# Patient Record
Sex: FEMALE | Race: BLACK OR AFRICAN AMERICAN | Employment: STUDENT | URBAN - METROPOLITAN AREA
[De-identification: names, ages, dates, MRNs, and addresses within clinical notes are randomized per-mention and may not be internally consistent; named-entity substitution may affect disease eponyms.]

---

## 2022-05-11 ENCOUNTER — APPOINTMENT (OUTPATIENT)
Dept: CT IMAGING | Age: 22
End: 2022-05-11
Attending: PEDIATRICS
Payer: COMMERCIAL

## 2022-05-11 ENCOUNTER — HOSPITAL ENCOUNTER (EMERGENCY)
Age: 22
Discharge: HOME | End: 2022-05-11
Attending: PEDIATRICS
Payer: COMMERCIAL

## 2022-05-11 VITALS
HEART RATE: 62 BPM | TEMPERATURE: 98.3 F | WEIGHT: 136.91 LBS | SYSTOLIC BLOOD PRESSURE: 120 MMHG | DIASTOLIC BLOOD PRESSURE: 77 MMHG | RESPIRATION RATE: 20 BRPM | OXYGEN SATURATION: 99 %

## 2022-05-11 DIAGNOSIS — K60.2 ANAL FISSURE: ICD-10-CM

## 2022-05-11 DIAGNOSIS — N39.0 URINARY TRACT INFECTION WITHOUT HEMATURIA, SITE UNSPECIFIED: Primary | ICD-10-CM

## 2022-05-11 DIAGNOSIS — K64.9 HEMORRHOIDS, UNSPECIFIED HEMORRHOID TYPE: ICD-10-CM

## 2022-05-11 DIAGNOSIS — R45.851 SUICIDAL THOUGHTS: ICD-10-CM

## 2022-05-11 LAB
ALBUMIN SERPL-MCNC: 3.8 G/DL (ref 3.5–5)
ALBUMIN/GLOB SERPL: 1.1 {RATIO} (ref 1.1–2.2)
ALP SERPL-CCNC: 96 U/L (ref 45–117)
ALT SERPL-CCNC: 19 U/L (ref 12–78)
AMPHET UR QL SCN: NEGATIVE
ANION GAP SERPL CALC-SCNC: 4 MMOL/L (ref 5–15)
APPEARANCE UR: CLEAR
APTT PPP: 26.5 SEC (ref 22.1–31)
AST SERPL-CCNC: 21 U/L (ref 15–37)
ATRIAL RATE: 63 BPM
BACTERIA URNS QL MICRO: NEGATIVE /HPF
BARBITURATES UR QL SCN: NEGATIVE
BASOPHILS # BLD: 0 K/UL (ref 0–0.1)
BASOPHILS NFR BLD: 0 % (ref 0–1)
BENZODIAZ UR QL: NEGATIVE
BILIRUB SERPL-MCNC: 0.6 MG/DL (ref 0.2–1)
BILIRUB UR QL: NEGATIVE
BUN SERPL-MCNC: 9 MG/DL (ref 6–20)
BUN/CREAT SERPL: 10 (ref 12–20)
CALCIUM SERPL-MCNC: 8.8 MG/DL (ref 8.5–10.1)
CALCULATED P AXIS, ECG09: 58 DEGREES
CALCULATED R AXIS, ECG10: 82 DEGREES
CALCULATED T AXIS, ECG11: 51 DEGREES
CANNABINOIDS UR QL SCN: NEGATIVE
CHLORIDE SERPL-SCNC: 109 MMOL/L (ref 97–108)
CO2 SERPL-SCNC: 25 MMOL/L (ref 21–32)
COCAINE UR QL SCN: NEGATIVE
COLOR UR: ABNORMAL
COMMENT, HOLDF: NORMAL
CREAT SERPL-MCNC: 0.87 MG/DL (ref 0.55–1.02)
CRP SERPL-MCNC: <0.29 MG/DL (ref 0–0.6)
D DIMER PPP FEU-MCNC: 0.38 MG/L FEU (ref 0–0.65)
DIAGNOSIS, 93000: NORMAL
DIFFERENTIAL METHOD BLD: ABNORMAL
DRUG SCRN COMMENT,DRGCM: NORMAL
EOSINOPHIL # BLD: 0.2 K/UL (ref 0–0.4)
EOSINOPHIL NFR BLD: 2 % (ref 0–7)
EPITH CASTS URNS QL MICRO: ABNORMAL /LPF
ERYTHROCYTE [DISTWIDTH] IN BLOOD BY AUTOMATED COUNT: 12.9 % (ref 11.5–14.5)
ERYTHROCYTE [SEDIMENTATION RATE] IN BLOOD: 3 MM/HR (ref 0–20)
GLOBULIN SER CALC-MCNC: 3.6 G/DL (ref 2–4)
GLUCOSE SERPL-MCNC: 112 MG/DL (ref 65–100)
GLUCOSE UR STRIP.AUTO-MCNC: NEGATIVE MG/DL
HCG UR QL: NEGATIVE
HCT VFR BLD AUTO: 41.9 % (ref 35–47)
HGB BLD-MCNC: 15.4 G/DL (ref 11.5–16)
HGB UR QL STRIP: NEGATIVE
HYALINE CASTS URNS QL MICRO: ABNORMAL /LPF (ref 0–5)
IMM GRANULOCYTES # BLD AUTO: 0 K/UL (ref 0–0.04)
IMM GRANULOCYTES NFR BLD AUTO: 0 % (ref 0–0.5)
INR PPP: 1.1 (ref 0.9–1.1)
KETONES UR QL STRIP.AUTO: ABNORMAL MG/DL
LEUKOCYTE ESTERASE UR QL STRIP.AUTO: ABNORMAL
LIPASE SERPL-CCNC: 73 U/L (ref 73–393)
LYMPHOCYTES # BLD: 2 K/UL (ref 0.8–3.5)
LYMPHOCYTES NFR BLD: 21 % (ref 12–49)
MCH RBC QN AUTO: 31.5 PG (ref 26–34)
MCHC RBC AUTO-ENTMCNC: 36.8 G/DL (ref 30–36.5)
MCV RBC AUTO: 85.7 FL (ref 80–99)
METHADONE UR QL: NEGATIVE
MONOCYTES # BLD: 1 K/UL (ref 0–1)
MONOCYTES NFR BLD: 10 % (ref 5–13)
NEUTS SEG # BLD: 6.3 K/UL (ref 1.8–8)
NEUTS SEG NFR BLD: 67 % (ref 32–75)
NITRITE UR QL STRIP.AUTO: NEGATIVE
NRBC # BLD: 0 K/UL (ref 0–0.01)
NRBC BLD-RTO: 0 PER 100 WBC
OPIATES UR QL: NEGATIVE
P-R INTERVAL, ECG05: 130 MS
PCP UR QL: NEGATIVE
PH UR STRIP: 5 [PH] (ref 5–8)
PLATELET # BLD AUTO: 271 K/UL (ref 150–400)
PMV BLD AUTO: 10.9 FL (ref 8.9–12.9)
POTASSIUM SERPL-SCNC: 3.9 MMOL/L (ref 3.5–5.1)
PROT SERPL-MCNC: 7.4 G/DL (ref 6.4–8.2)
PROT UR STRIP-MCNC: NEGATIVE MG/DL
PROTHROMBIN TIME: 11 SEC (ref 9–11.1)
Q-T INTERVAL, ECG07: 392 MS
QRS DURATION, ECG06: 76 MS
QTC CALCULATION (BEZET), ECG08: 401 MS
RBC # BLD AUTO: 4.89 M/UL (ref 3.8–5.2)
RBC #/AREA URNS HPF: ABNORMAL /HPF (ref 0–5)
SAMPLES BEING HELD,HOLD: NORMAL
SODIUM SERPL-SCNC: 138 MMOL/L (ref 136–145)
SP GR UR REFRACTOMETRY: 1.02 (ref 1–1.03)
THERAPEUTIC RANGE,PTTT: NORMAL SECS (ref 58–77)
TROPONIN-HIGH SENSITIVITY: <4 NG/L (ref 0–51)
UR CULT HOLD, URHOLD: NORMAL
UROBILINOGEN UR QL STRIP.AUTO: 0.2 EU/DL (ref 0.2–1)
VENTRICULAR RATE, ECG03: 63 BPM
WBC # BLD AUTO: 9.5 K/UL (ref 3.6–11)
WBC URNS QL MICRO: ABNORMAL /HPF (ref 0–4)

## 2022-05-11 PROCEDURE — 81001 URINALYSIS AUTO W/SCOPE: CPT

## 2022-05-11 PROCEDURE — 96361 HYDRATE IV INFUSION ADD-ON: CPT

## 2022-05-11 PROCEDURE — 81025 URINE PREGNANCY TEST: CPT

## 2022-05-11 PROCEDURE — 80053 COMPREHEN METABOLIC PANEL: CPT

## 2022-05-11 PROCEDURE — 85379 FIBRIN DEGRADATION QUANT: CPT

## 2022-05-11 PROCEDURE — 85610 PROTHROMBIN TIME: CPT

## 2022-05-11 PROCEDURE — 83690 ASSAY OF LIPASE: CPT

## 2022-05-11 PROCEDURE — 85652 RBC SED RATE AUTOMATED: CPT

## 2022-05-11 PROCEDURE — 85025 COMPLETE CBC W/AUTO DIFF WBC: CPT

## 2022-05-11 PROCEDURE — 74011000636 HC RX REV CODE- 636: Performed by: RADIOLOGY

## 2022-05-11 PROCEDURE — 93005 ELECTROCARDIOGRAM TRACING: CPT

## 2022-05-11 PROCEDURE — 85730 THROMBOPLASTIN TIME PARTIAL: CPT

## 2022-05-11 PROCEDURE — 36415 COLL VENOUS BLD VENIPUNCTURE: CPT

## 2022-05-11 PROCEDURE — C9113 INJ PANTOPRAZOLE SODIUM, VIA: HCPCS | Performed by: PEDIATRICS

## 2022-05-11 PROCEDURE — 96365 THER/PROPH/DIAG IV INF INIT: CPT

## 2022-05-11 PROCEDURE — 86140 C-REACTIVE PROTEIN: CPT

## 2022-05-11 PROCEDURE — 74011250637 HC RX REV CODE- 250/637: Performed by: PEDIATRICS

## 2022-05-11 PROCEDURE — 74011000250 HC RX REV CODE- 250: Performed by: PEDIATRICS

## 2022-05-11 PROCEDURE — 87086 URINE CULTURE/COLONY COUNT: CPT

## 2022-05-11 PROCEDURE — 84484 ASSAY OF TROPONIN QUANT: CPT

## 2022-05-11 PROCEDURE — 99285 EMERGENCY DEPT VISIT HI MDM: CPT

## 2022-05-11 PROCEDURE — 74177 CT ABD & PELVIS W/CONTRAST: CPT

## 2022-05-11 PROCEDURE — 74011000258 HC RX REV CODE- 258: Performed by: PEDIATRICS

## 2022-05-11 PROCEDURE — 74011250636 HC RX REV CODE- 250/636: Performed by: PEDIATRICS

## 2022-05-11 PROCEDURE — 96375 TX/PRO/DX INJ NEW DRUG ADDON: CPT

## 2022-05-11 PROCEDURE — 80307 DRUG TEST PRSMV CHEM ANLYZR: CPT

## 2022-05-11 RX ORDER — ONDANSETRON 4 MG/1
4 TABLET, FILM COATED ORAL
Qty: 10 TABLET | Refills: 0 | Status: SHIPPED | OUTPATIENT
Start: 2022-05-11

## 2022-05-11 RX ORDER — CEPHALEXIN 500 MG/1
500 CAPSULE ORAL 4 TIMES DAILY
Qty: 28 CAPSULE | Refills: 0 | Status: SHIPPED | OUTPATIENT
Start: 2022-05-11 | End: 2022-05-18

## 2022-05-11 RX ORDER — ONDANSETRON 2 MG/ML
4 INJECTION INTRAMUSCULAR; INTRAVENOUS
Status: COMPLETED | OUTPATIENT
Start: 2022-05-11 | End: 2022-05-11

## 2022-05-11 RX ORDER — IBUPROFEN 600 MG/1
600 TABLET ORAL
Qty: 20 TABLET | Refills: 0 | Status: SHIPPED | OUTPATIENT
Start: 2022-05-11

## 2022-05-11 RX ORDER — POLYETHYLENE GLYCOL 3350 17 G/17G
17 POWDER, FOR SOLUTION ORAL DAILY
Qty: 850 G | Refills: 0 | Status: SHIPPED | OUTPATIENT
Start: 2022-05-11

## 2022-05-11 RX ADMIN — ALUMINUM HYDROXIDE, MAGNESIUM HYDROXIDE, AND SIMETHICONE 40 ML: 200; 200; 20 SUSPENSION ORAL at 03:20

## 2022-05-11 RX ADMIN — SODIUM CHLORIDE 40 MG: 9 INJECTION, SOLUTION INTRAMUSCULAR; INTRAVENOUS; SUBCUTANEOUS at 03:21

## 2022-05-11 RX ADMIN — CEFTRIAXONE SODIUM 2 G: 2 INJECTION, POWDER, FOR SOLUTION INTRAMUSCULAR; INTRAVENOUS at 03:26

## 2022-05-11 RX ADMIN — SODIUM CHLORIDE 1000 ML: 9 INJECTION, SOLUTION INTRAVENOUS at 01:31

## 2022-05-11 RX ADMIN — IOPAMIDOL 100 ML: 755 INJECTION, SOLUTION INTRAVENOUS at 02:57

## 2022-05-11 RX ADMIN — ONDANSETRON HYDROCHLORIDE 4 MG: 2 SOLUTION INTRAMUSCULAR; INTRAVENOUS at 01:31

## 2022-05-11 NOTE — ED NOTES
Antibiotic running without difficulty. Pt resting in stretcher with boyfriend at bedside. MD at bedside discussing results and follow-up care.

## 2022-05-11 NOTE — BSMART NOTE
BSMART assessment completed, and suicide risk level noted to be low. Primary Nurse Delano Jimenez and Charge Nurse [facility] and Physician Romulo notified. Concerns not observed. Security/Off- has not been notified.

## 2022-05-11 NOTE — ED TRIAGE NOTES
Triage Note: Pt. C/o abdominal pain that started around 11 pm. Pt. States she had a BM and noticed a glob of blood on tissue paper. Pt. Denies fever. No Meds PTA.

## 2022-05-11 NOTE — BSMART NOTE
SAFETY PLAN    A suicide Safety Plan is a document that supports someone when they are having thoughts of suicide. Warning Signs that indicate a suicidal crisis may be developing: What (situations, thoughts, feelings, body sensations, behaviors, etc.) do you experience that lets you know you are beginning to think about suicide? 1. Excessive Idle time   2. Withdrawn from others and refraining from talking about concerns  3. Irritability   4. Irrationally making decisions   5. Inability to complete every day task (bathing, brushing teeth , eating routinely, or sleeping )    Internal and External Coping Strategies:  What things can I do (relaxation techniques, hobbies, physical activities, etc.) to take my mind off my problems without contacting another person?  -Sleeping taking routine naps   -photography   -videography   -modeling   -Spending reasonable time with preferred loved ones doing something fun /redirect away from idle time  -deep breathing   -counting from 1-10 and 10-1 when feeling anxious          People and social settings that provide distraction: Who can I call or where can I go to distract me? Boyfriend Ralf Mora    People whom I can ask for help: Who can I call when I need help - for example, friends, family, clergy, someone else? Boy Friend / Brittany Wright   God Father / Mr. Titi Núñez [phone]  God Mother/Lida Núñez [phone]    Professionals or [address] I can contact during a crisis: Who can I call for help - for example, my doctor, my psychiatrist, my psychologist, a mental health provider, a suicide hotline? 1. Suicide Prevention Lifeline: 3-145-910-TALK (6051)    2. [address] [place] [address] [phone]     3. [facility] Crisis line - [phone]      [place] [employer] - [phone]  (requesting a CIT train officer/ A Crisis Intervention Team (CIT).   *CIT improves communication, identifies mental health resources for those in crisis and ensures officer and community safety. *    Making the environment safe: How can I make my environment (house/apartment/living space) safer? For example, can I remove guns, medications, and other items? 1. Removing all sharps and items that could be considered weapons   2. Providing supervision throughout the night to ensure health and safety   3.  Removal of pills and locking away     Writer provided Pt the following resources:  [facility]/National counseling group   OP providers list  Partial hospitalization program if needed     Writer encouraged Pt to follow-up with her insurance to locate additional providers in her network for counseling (individual and couples)

## 2022-05-11 NOTE — BSMART NOTE
Writer contacted God father/ Mr. Denver Quivers at number provided [phone] who agreed to complete check-in with Pt and Pt's boyfriend Ramakrishna Rolan) who was present with Pt at bedside.

## 2022-05-11 NOTE — ED NOTES
Pt discharged home. Pt acting age appropriately, respirations regular and unlabored, cap refill less than two seconds. Skin pink, dry and warm. Lungs clear bilaterally. No further complaints at this time. Pt verbalized understanding of discharge paperwork and has no further questions at this time. Education provided about continuation of care, follow up care and medication administration: Take all medications as prescribed by provider. Follow-up with GI for further evaluation and return to ED for worsening symptoms or further concerns. Pt able to provided teach back about discharge instructions.

## 2022-05-11 NOTE — ED PROVIDER NOTES
The history is provided by the patient and a parent. Abdominal Pain   This is a new problem. The current episode started 6 to 12 hours ago. The problem occurs constantly. The problem has been gradually worsening (has had some back and body aches. no fever. Abd pain from epigastrum to suprapubic tonight. Had a normal BM but when wiping had blood and clots. ). The pain is located in the generalized abdominal region. The quality of the pain is cramping and sharp. The pain is moderate. Associated symptoms include anorexia (for a few days. Worse today), nausea, frequency, myalgias, chest pain and back pain. Pertinent negatives include no fever, no belching, no diarrhea, no flatus, no hematochezia, no melena (blood on tp), no vomiting, no constipation, no dysuria, no hematuria, no headaches and no trauma. Nothing worsens the pain. The pain is relieved by nothing. Her past medical history is significant for PUD. No vaginal complaints    IMM UTD    SI,   Past Medical History:   Diagnosis Date    Gonorrhea        Past Surgical History:   Procedure Laterality Date    HX ORTHOPAEDIC      left arm right leg         History reviewed. No pertinent family history.     Social History     Socioeconomic History    Marital status: SINGLE     Spouse name: Not on file    Number of children: Not on file    Years of education: Not on file    Highest education level: Not on file   Occupational History    Not on file   Tobacco Use    Smoking status: Never Smoker    Smokeless tobacco: Never Used   Substance and Sexual Activity    Alcohol use: No    Drug use: No    Sexual activity: Never     Partners: Male   Other Topics Concern    Not on file   Social History Narrative    Not on file     Social Determinants of Health     Financial Resource Strain:     Difficulty of Paying Living Expenses: Not on file   Food Insecurity:     Worried About Running Out of Food in the Last Year: Not on file    [address] in the Last Year: Not on file   Transportation Needs:     Lack of Transportation (Medical): Not on file    Lack of Transportation (Non-Medical): Not on file   Physical Activity:     Days of Exercise per Week: Not on file    Minutes of Exercise per Session: Not on file   Stress:     Feeling of Stress : Not on file   Social Connections:     Frequency of Communication with Friends and Family: Not on file    Frequency of Social Gatherings with Friends and Family: Not on file    Attends [employer] Services: Not on file    Active Member of [address] [place] [address] [employer] or Organizations: Not on file    Attends Club or Organization Meetings: Not on file    Marital Status: Not on file   Intimate Partner Violence:     Fear of Current or Ex-Partner: Not on file    Emotionally Abused: Not on file    Physically Abused: Not on file    Sexually Abused: Not on file   Housing Stability:     Unable to Pay for Housing in the Last Year: Not on file    Number of [place] in the Last Year: Not on file    Unstable Housing in the Last Year: Not on file         ALLERGIES: Patient has no known allergies. Review of Systems   Constitutional: Positive for appetite change. Negative for activity change, chills and fever. HENT: Negative for sore throat. Eyes: Negative for photophobia and visual disturbance. Respiratory: Negative for cough, chest tightness and shortness of breath. Cardiovascular: Positive for chest pain. Gastrointestinal: Positive for abdominal pain, anorexia (for a few days. Worse today) and nausea. Negative for constipation, diarrhea, flatus, hematochezia, melena (blood on tp) and vomiting. Genitourinary: Positive for flank pain and frequency. Negative for decreased urine volume, dysuria, enuresis, hematuria, menstrual problem, vaginal bleeding, vaginal discharge and vaginal pain. Musculoskeletal: Positive for back pain and myalgias. Negative for neck pain. Skin: Negative for rash.    Allergic/Immunologic: Negative for immunocompromised state. Neurological: Positive for light-headedness. Negative for headaches. Hematological: Does not bruise/bleed easily. Psychiatric/Behavioral: Positive for suicidal ideas. Vitals:    05/11/22 0058   Weight: 62.1 kg (136 lb 14.5 oz)   HR: 79  RR; 20  BP:127/87 T:98.2    Physical Exam   Physical Exam   Constitutional: Appears well-developed and well-nourished. uncomfortable  HENT:   Head: NCAT  Ears: Right Ear: Tympanic membrane normal. Left Ear: Tympanic membrane normal.   Nose: Nose normal. No nasal discharge. Mouth/Throat: Mucous membranes are moist. Pharynx is normal.   Eyes: Conjunctivae are normal. Right eye exhibits no discharge. Left eye exhibits no discharge. Neck: Normal range of motion. Neck supple. Cardiovascular: Normal rate, regular rhythm, S1 normal and S2 normal. No murmur   2+ distal pulses   Pulmonary/Chest: Effort normal and breath sounds normal. No nasal flaring or stridor. No respiratory distress. no wheezes. no rhonchi. no rales. no retraction. Abdominal: Soft. . Generalized tenderness. No rebound or guarding. No hernia. No masses or HSM. Bilateral CVA tenderness  Genitourinary:  Normal inspection. No rash. External hemhorroids and small fissure noted  Musculoskeletal: Normal range of motion. no edema, no tenderness, no deformity and no signs of injury. Lymphadenopathy:     no cervical adenopathy. Neurological:  alert. normal strength. normal muscle tone. No focal defecits  Skin: Skin is warm and dry. Capillary refill takes less than 3 seconds. Turgor is normal. No petechiae, no purpura and no rash noted. No cyanosis. MDM     Bilateral flank pain. Blood in stool. Bodyaches. Hx of Ulcers. Unsure of here she was scoped in past. Off meds now. Will check CT, UA, labs, IM. UTi, vs IBS, vs PUD. PPI, zofran, bolus. Patient also has [place] thoughts.  BSMART consult    5:37 AM  Recent Results (from the past 24 hour(s))   URINALYSIS W/MICROSCOPIC    Collection Time: 05/11/22  1:22 AM   Result Value Ref Range    Color YELLOW/STRAW      Appearance CLEAR CLEAR      Specific gravity 1.019 1.003 - 1.030      pH (UA) 5.0 5.0 - 8.0      Protein Negative NEG mg/dL    Glucose Negative NEG mg/dL    Ketone TRACE (A) NEG mg/dL    Bilirubin Negative NEG      Blood Negative NEG      Urobilinogen 0.2 0.2 - 1.0 EU/dL    Nitrites Negative NEG      Leukocyte Esterase LARGE (A) NEG      WBC  0 - 4 /hpf    RBC 0-5 0 - 5 /hpf    Epithelial cells FEW FEW /lpf    Bacteria Negative NEG /hpf    Hyaline cast 2-5 0 - 5 /lpf   URINE CULTURE HOLD SAMPLE    Collection Time: 05/11/22  1:22 AM    Specimen: Serum; Urine   Result Value Ref Range    Urine culture hold        Urine on hold in Microbiology dept for 2 days. If unpreserved urine is submitted, it cannot be used for addtional testing after 24 hours, recollection will be required. DRUG SCREEN, URINE    Collection Time: 05/11/22  1:22 AM   Result Value Ref Range    AMPHETAMINES Negative NEG      BARBITURATES Negative NEG      BENZODIAZEPINES Negative NEG      COCAINE Negative NEG      METHADONE Negative NEG      OPIATES Negative NEG      PCP(PHENCYCLIDINE) Negative NEG      THC (TH-CANNABINOL) Negative NEG      Drug screen comment (NOTE)    CBC WITH AUTOMATED DIFF    Collection Time: 05/11/22  1:28 AM   Result Value Ref Range    WBC 9.5 3.6 - 11.0 K/uL    RBC 4.89 3.80 - 5.20 M/uL    HGB 15.4 11.5 - 16.0 g/dL    HCT 41.9 35.0 - 47.0 %    MCV 85.7 80.0 - 99.0 FL    MCH 31.5 26.0 - 34.0 PG    MCHC 36.8 (H) 30.0 - 36.5 g/dL    RDW 12.9 11.5 - 14.5 %    PLATELET 145 997 - 642 K/uL    MPV 10.9 8.9 - 12.9 FL    NRBC 0.0 0  WBC    ABSOLUTE NRBC 0.00 0.00 - 0.01 K/uL    NEUTROPHILS 67 32 - 75 %    LYMPHOCYTES 21 12 - 49 %    MONOCYTES 10 5 - 13 %    EOSINOPHILS 2 0 - 7 %    BASOPHILS 0 0 - 1 %    IMMATURE GRANULOCYTES 0 0.0 - 0.5 %    ABS. NEUTROPHILS 6.3 1.8 - 8.0 K/UL    ABS. LYMPHOCYTES 2.0 0.8 - 3.5 K/UL    ABS.  MONOCYTES 1.0 0.0 - 1.0 K/UL    ABS. EOSINOPHILS 0.2 0.0 - 0.4 K/UL    ABS. BASOPHILS 0.0 0.0 - 0.1 K/UL    ABS. IMM. GRANS. 0.0 0.00 - 0.04 K/UL    DF AUTOMATED     METABOLIC PANEL, COMPREHENSIVE    Collection Time: 05/11/22  1:28 AM   Result Value Ref Range    Sodium 138 136 - 145 mmol/L    Potassium 3.9 3.5 - 5.1 mmol/L    Chloride 109 (H) 97 - 108 mmol/L    CO2 25 21 - 32 mmol/L    Anion gap 4 (L) 5 - 15 mmol/L    Glucose 112 (H) 65 - 100 mg/dL    BUN 9 6 - 20 MG/DL    Creatinine 0.87 0.55 - 1.02 MG/DL    BUN/Creatinine ratio 10 (L) 12 - 20      GFR est AA >60 >60 ml/min/1.73m2    GFR est non-AA >60 >60 ml/min/1.73m2    Calcium 8.8 8.5 - 10.1 MG/DL    Bilirubin, total 0.6 0.2 - 1.0 MG/DL    ALT (SGPT) 19 12 - 78 U/L    AST (SGOT) 21 15 - 37 U/L    Alk.  phosphatase 96 45 - 117 U/L    Protein, total 7.4 6.4 - 8.2 g/dL    Albumin 3.8 3.5 - 5.0 g/dL    Globulin 3.6 2.0 - 4.0 g/dL    A-G Ratio 1.1 1.1 - 2.2     LIPASE    Collection Time: 05/11/22  1:28 AM   Result Value Ref Range    Lipase 73 73 - 393 U/L   SED RATE (ESR)    Collection Time: 05/11/22  1:28 AM   Result Value Ref Range    Sed rate, automated 3 0 - 20 mm/hr   C REACTIVE PROTEIN, QT    Collection Time: 05/11/22  1:28 AM   Result Value Ref Range    C-Reactive protein <0.29 0.00 - 0.60 mg/dL   PROTHROMBIN TIME + INR    Collection Time: 05/11/22  1:28 AM   Result Value Ref Range    INR 1.1 0.9 - 1.1      Prothrombin time 11.0 9.0 - 11.1 sec   PTT    Collection Time: 05/11/22  1:28 AM   Result Value Ref Range    aPTT 26.5 22.1 - 31.0 sec    aPTT, therapeutic range     58.0 - 77.0 SECS   D DIMER    Collection Time: 05/11/22  1:28 AM   Result Value Ref Range    D-dimer 0.38 0.00 - 0.65 mg/L FEU   TROPONIN-HIGH SENSITIVITY    Collection Time: 05/11/22  1:28 AM   Result Value Ref Range    Troponin-High Sensitivity <4 0 - 51 ng/L   SAMPLES BEING HELD    Collection Time: 05/11/22  1:28 AM   Result Value Ref Range    SAMPLES BEING HELD 1LAV 1RED     COMMENT        Add-on orders for these samples will be processed based on acceptable specimen integrity and analyte stability, which may vary by analyte. EKG, 12 LEAD, INITIAL    Collection Time: 05/11/22  1:47 AM   Result Value Ref Range    Ventricular Rate 63 BPM    Atrial Rate 63 BPM    P-R Interval 130 ms    QRS Duration 76 ms    Q-T Interval 392 ms    QTC Calculation (Bezet) 401 ms    Calculated P Axis 58 degrees    Calculated R Axis 82 degrees    Calculated T Axis 51 degrees    Diagnosis       Normal sinus rhythm with sinus arrhythmia  Cannot rule out Anterior infarct , age undetermined  Abnormal ECG  No previous ECGs available     HCG URINE, QL. - POC    Collection Time: 05/11/22  2:55 AM   Result Value Ref Range    Pregnancy test,urine (POC) Negative NEG         CT ABD PELV W CONT    Result Date: 5/11/2022  EXAM: CT ABD PELV W CONT INDICATION: pain, bleeding COMPARISON: None CONTRAST: 100 mL of Isovue-370. ORAL CONTRAST: Oral contrast was administered to better evaluate the bowel. TECHNIQUE: Following the uneventful intravenous administration of contrast, thin axial images were obtained through the abdomen and pelvis. Coronal and sagittal reconstructions were generated. CT dose reduction was achieved through use of a standardized protocol tailored for this examination and automatic exposure control for dose modulation. FINDINGS: LOWER THORAX: No significant abnormality in the incidentally imaged lower chest. LIVER: No mass. BILIARY TREE: Gallbladder is contracted. CBD is not dilated. SPLEEN: within normal limits. PANCREAS: No mass or ductal dilatation. ADRENALS: Unremarkable. KIDNEYS: No mass, calculus, or hydronephrosis. STOMACH: Unremarkable. SMALL BOWEL: No dilatation or wall thickening. COLON: No dilatation or wall thickening. APPENDIX: Normal. PERITONEUM: No ascites or pneumoperitoneum. RETROPERITONEUM: No lymphadenopathy or aortic aneurysm. REPRODUCTIVE ORGANS: Intrauterine device within the uterus. URINARY BLADDER: No mass or calculus. BONES: No destructive bone lesion. ABDOMINAL WALL: No mass or hernia. ADDITIONAL COMMENTS: N/A     No evidence of acute abdominal or pelvic process. Labs and CT normal. Hemorrhoids on exam. Start miralax. GI and CR referral. With Hx of PUD start PPI and should see about scope. UTI found and no pyelo on CT. Abx started. Cx sent    BSMART safety plan with BF and god parents. Discharged with resources. Diagnosis, laboratory tests, medications, return instructions and follow up plan have been discussed with the patient. The patient has been given the opportunity to ask questions. The patient expresses understanding of the care plan, return and follow up instructions. The patient expresses understanding of the need to follow up with the patient's primary care provider or with the ER with any persistent fever, inability to drink fluids, decrease in urine output, or for any other signs or symptoms that are concerning to the patient. ICD-10-CM ICD-9-CM   1. Urinary tract infection without hematuria, site unspecified  N39.0 599.0   2. Suicidal thoughts  R45.851 V62.84   3. Hemorrhoids, unspecified hemorrhoid type  K64.9 455.6   4. Anal fissure  K60.2 565.0       Discharge Medication List as of 5/11/2022  3:48 AM      START taking these medications    Details   cephALEXin (Keflex) 500 mg capsule Take 1 Capsule by mouth four (4) times daily for 7 days. , Normal, Disp-28 Capsule, R-0      ondansetron hcl (Zofran) 4 mg tablet Take 1 Tablet by mouth every eight (8) hours as needed for Nausea or Vomiting., Normal, Disp-10 Tablet, R-0      ibuprofen (MOTRIN) 600 mg tablet Take 1 Tablet by mouth every six (6) hours as needed (fever. Take sparingly as you have ulcer history). , Normal, Disp-20 Tablet, R-0      polyethylene glycol (Miralax) 17 gram/dose powder Take 17 g by mouth daily.  1 tablespoon with 8 oz of water daily, Normal, Disp-850 g, R-0             Follow-up Information     Follow up With Specialties Details Why Contact Info    James Bran MD Gastroenterology  Call as needed for hemorrhoids [address] [facility] [address]      Juan Rolon MD Colon and Rectal Surgery   [address] [facility] [id number] [phone]            I have reviewed discharge instructions with the parent. The parent verbalized understanding. 5:39 AM  Raymon Daniel M.D.     Critical Care  Performed by: Katlyn Escobar MD  Authorized by: Katlyn Escobar MD     Critical care provider statement:     Critical care time (minutes):  45    Critical care start time:  5/11/2022 1:15 AM    Critical care end time:  5/11/2022 4:20 AM    Critical care time was exclusive of:  Separately billable procedures and treating other patients and teaching time    Critical care was necessary to treat or prevent imminent or life-threatening deterioration of the following conditions: Concern for rectal bleed, SI.    Critical care was time spent personally by me on the following activities:  Blood draw for specimens, ordering and performing treatments and interventions, ordering and review of laboratory studies, ordering and review of radiographic studies, re-evaluation of patient's condition, review of old charts, obtaining history from patient or surrogate, interpretation of cardiac output measurements, evaluation of patient's response to treatment, development of treatment plan with patient or surrogate and examination of patient    I assumed direction of critical care for this patient from another provider in my specialty: no

## 2022-05-11 NOTE — BSMART NOTE
Comprehensive Assessment Form Part 1      Section I - Disposition    DDx; Major Depression         The Medical Doctor to Psychiatrist conference was not completed. The Medical Doctor is in agreement with Psychiatrist disposition because of (reason) Pt not meeting criteria for inpatient psych admission during this time  The plan is discharge Pt with resources   The on-call Psychiatrist consulted was   The admitting Psychiatrist will be Dr. Tye Candelaria  The admitting Diagnosis is N/A  The Payor source is [employer]/[facility] [employer]     Section II - Integrated Summary  Summary:    Pt is 24 y/o [place], female who was transported to ED by her boyfriend/Alonza who was present. Writer met with Pt face-to face at bedside who present A&Ox4, and reported to have thoughts of SI without a plan. Pt reported to that \"have wished she didn't have to deal with school or anything again\" during this assessment and went on to report her feeling \"the statement to have been impulsive but honest\". Pt denies HI, AH/VH, or significant changes to her sleep and appetite. Pt reported to have a history of self-harm by cutting, which she last reported to have engaged in back in Dec 2021. Pt reports to have no history of working with out patient providers and is currently seeking resources today. Pt reports to be \"adjusting to school ending and having more time while Andrew's work schedule gets busy\". Pt reports to struggle with communicating related frustrations of their schedule not \"being in alignment\". Pt reports this to be \"80% of my stress\". Pt is not seeking admission for psych during this time and processed with writer regarding possible options for coping. Pt shared that she does enjoy modeling, photography, and videography as coping skills to reduce idle time.  Pt was able to safety plan with writer to follow through with OP resources which included; PHP, Therapist/ counseling , psychiatrist, Fortino Crigler, and crisis hot line through [facility]. Pt provided writer consent to speak with boyfriend/Andrew and God father/Mr. Alexis to formulate a solid safety plan for ensuring supervision for the next 48 hours in the event of crisis. Writer completed safety plan and spoke with ED,Provider who was in agreement with Pt to discharge with resources. No grounds to seek TDO during this time. The patient has demonstrated mental capacity to provide informed consent. The information is given by the patient. The Chief Complaint is abdominal pain, Melena  The Precipitant Factors are romantic relationship strain , issues with relationship with father after he \"cut me off from funds\", and transitioning with school/classes ended. Previous Hospitalizations: Yes,   The patient has not previously been in restraints. Current Psychiatrist / : currently not linked/ Writer provided resources. Lethality Assessment:    The potential for suicide noted by the following: None. The potential for homicide is not noted. The patient has not been a perpetrator of sexual or physical abuse. There are not pending charges. The patient is not felt to be at risk for self harm or harm to others. The attending nurse was advised that security has not been notified. Section III - Psychosocial  The patient's overall mood and attitude is pleasant and cooperative. Feelings of helplessness and hopelessness are not observed. Generalized anxiety is not observed. Panic is not observed. Phobias are not observed. Obsessive compulsive tendencies are not observed. Section IV - Mental Status Exam  The patient's appearance shows no evidence of impairment. The patient's behavior shows no evidence of impairment. The patient is oriented to time, place, person and situation. The patient's speech shows no evidence of impairment. The patient's mood is euthymic. The range of affect is labile.   The patient's thought content demonstrates no evidence of impairment. The thought process shows no evidence of impairment. The patient's perception shows no evidence of impairment. The patient's memory shows no evidence of impairment. The patient's appetite shows no evidence of impairment. The patient's sleep shows no evidence of impairment. The patient's insight shows no evidence of impairment. The patient's judgement shows no evidence of impairment. Section V - Substance Abuse  The patient is not using substances. Section VI - Living Arrangements  The patient is single. The patient lives alone. The patient has no children. The patient does plan to return home upon discharge. The patient does not have legal issues pending. The patient's source of income comes from employment. Pentecostal and cultural practices have been noted and include: Pt reported to be Anabaptist and \"believe in God\". The patient's greatest support comes from Boy friend and God father/Mr Gianfranco Alexis and these people will be involved with the treatment. The patient has not been in an event described as horrible or outside the realm of ordinary life experience either currently or in the past.The patient has not been a victim of sexual/physical abuse. Section VII - Other Areas of Clinical Concern  The highest grade achieved is some college with the overall quality of school experience being described as \"pretty good, I'm managing but tired\". The patient is currently employed and speaks [place] as a primary language. The patient has no communication impairments affecting communication. The patient's preference for learning can be described as: can read and write adequately.   The patient's hearing is normal.  The patient's vision is normal.      RAUL Whyte, [facility]

## 2022-05-12 LAB
BACTERIA SPEC CULT: NORMAL
SERVICE CMNT-IMP: NORMAL

## 2023-05-23 RX ORDER — IBUPROFEN 600 MG/1
600 TABLET ORAL EVERY 6 HOURS PRN
COMMUNITY
Start: 2022-05-11

## 2023-05-23 RX ORDER — POLYETHYLENE GLYCOL 3350 17 G/17G
17 POWDER, FOR SOLUTION ORAL DAILY
COMMUNITY
Start: 2022-05-11

## 2023-05-23 RX ORDER — ONDANSETRON 4 MG/1
4 TABLET, FILM COATED ORAL EVERY 8 HOURS PRN
COMMUNITY
Start: 2022-05-11